# Patient Record
Sex: FEMALE | Race: BLACK OR AFRICAN AMERICAN | NOT HISPANIC OR LATINO | URBAN - METROPOLITAN AREA
[De-identification: names, ages, dates, MRNs, and addresses within clinical notes are randomized per-mention and may not be internally consistent; named-entity substitution may affect disease eponyms.]

---

## 2024-12-31 ENCOUNTER — APPOINTMENT (OUTPATIENT)
Dept: RADIOLOGY | Facility: HOSPITAL | Age: 34
End: 2024-12-31
Payer: COMMERCIAL

## 2024-12-31 ENCOUNTER — HOSPITAL ENCOUNTER (EMERGENCY)
Facility: HOSPITAL | Age: 34
Discharge: HOME | End: 2025-01-01
Attending: EMERGENCY MEDICINE
Payer: COMMERCIAL

## 2024-12-31 VITALS
SYSTOLIC BLOOD PRESSURE: 121 MMHG | TEMPERATURE: 98.8 F | BODY MASS INDEX: 25.77 KG/M2 | RESPIRATION RATE: 18 BRPM | HEIGHT: 70 IN | HEART RATE: 81 BPM | WEIGHT: 180 LBS | OXYGEN SATURATION: 98 % | DIASTOLIC BLOOD PRESSURE: 76 MMHG

## 2024-12-31 DIAGNOSIS — R11.2 NAUSEA AND VOMITING, UNSPECIFIED VOMITING TYPE: Primary | ICD-10-CM

## 2024-12-31 LAB
ALBUMIN SERPL BCP-MCNC: 5 G/DL (ref 3.4–5)
ALP SERPL-CCNC: 36 U/L (ref 33–110)
ALT SERPL W P-5'-P-CCNC: 21 U/L (ref 7–45)
ANION GAP SERPL CALC-SCNC: 13 MMOL/L (ref 10–20)
AST SERPL W P-5'-P-CCNC: 20 U/L (ref 9–39)
B-HCG SERPL-ACNC: <2 MIU/ML
BASOPHILS # BLD AUTO: 0.03 X10*3/UL (ref 0–0.1)
BASOPHILS NFR BLD AUTO: 0.3 %
BILIRUB SERPL-MCNC: 0.8 MG/DL (ref 0–1.2)
BUN SERPL-MCNC: 16 MG/DL (ref 6–23)
CALCIUM SERPL-MCNC: 9.9 MG/DL (ref 8.6–10.3)
CHLORIDE SERPL-SCNC: 101 MMOL/L (ref 98–107)
CO2 SERPL-SCNC: 27 MMOL/L (ref 21–32)
CREAT SERPL-MCNC: 0.7 MG/DL (ref 0.5–1.05)
EGFRCR SERPLBLD CKD-EPI 2021: >90 ML/MIN/1.73M*2
EOSINOPHIL # BLD AUTO: 0 X10*3/UL (ref 0–0.7)
EOSINOPHIL NFR BLD AUTO: 0 %
ERYTHROCYTE [DISTWIDTH] IN BLOOD BY AUTOMATED COUNT: 13.1 % (ref 11.5–14.5)
FLUAV RNA RESP QL NAA+PROBE: NOT DETECTED
FLUBV RNA RESP QL NAA+PROBE: NOT DETECTED
GLUCOSE SERPL-MCNC: 109 MG/DL (ref 74–99)
HCT VFR BLD AUTO: 38.7 % (ref 36–46)
HGB BLD-MCNC: 12.7 G/DL (ref 12–16)
IMM GRANULOCYTES # BLD AUTO: 0.05 X10*3/UL (ref 0–0.7)
IMM GRANULOCYTES NFR BLD AUTO: 0.6 % (ref 0–0.9)
LIPASE SERPL-CCNC: 6 U/L (ref 9–82)
LYMPHOCYTES # BLD AUTO: 1.81 X10*3/UL (ref 1.2–4.8)
LYMPHOCYTES NFR BLD AUTO: 21.1 %
MAGNESIUM SERPL-MCNC: 2.11 MG/DL (ref 1.6–2.4)
MCH RBC QN AUTO: 28.3 PG (ref 26–34)
MCHC RBC AUTO-ENTMCNC: 32.8 G/DL (ref 32–36)
MCV RBC AUTO: 86 FL (ref 80–100)
MONOCYTES # BLD AUTO: 0.45 X10*3/UL (ref 0.1–1)
MONOCYTES NFR BLD AUTO: 5.2 %
NEUTROPHILS # BLD AUTO: 6.24 X10*3/UL (ref 1.2–7.7)
NEUTROPHILS NFR BLD AUTO: 72.8 %
NRBC BLD-RTO: 0 /100 WBCS (ref 0–0)
PLATELET # BLD AUTO: 304 X10*3/UL (ref 150–450)
POTASSIUM SERPL-SCNC: 3.7 MMOL/L (ref 3.5–5.3)
PROT SERPL-MCNC: 8.4 G/DL (ref 6.4–8.2)
RBC # BLD AUTO: 4.48 X10*6/UL (ref 4–5.2)
SARS-COV-2 RNA RESP QL NAA+PROBE: NOT DETECTED
SODIUM SERPL-SCNC: 137 MMOL/L (ref 136–145)
WBC # BLD AUTO: 8.6 X10*3/UL (ref 4.4–11.3)

## 2024-12-31 PROCEDURE — 99285 EMERGENCY DEPT VISIT HI MDM: CPT | Mod: 25 | Performed by: EMERGENCY MEDICINE

## 2024-12-31 PROCEDURE — 74177 CT ABD & PELVIS W/CONTRAST: CPT | Mod: FOREIGN READ | Performed by: RADIOLOGY

## 2024-12-31 PROCEDURE — 2500000004 HC RX 250 GENERAL PHARMACY W/ HCPCS (ALT 636 FOR OP/ED): Performed by: EMERGENCY MEDICINE

## 2024-12-31 PROCEDURE — 36415 COLL VENOUS BLD VENIPUNCTURE: CPT | Performed by: NURSE PRACTITIONER

## 2024-12-31 PROCEDURE — 83735 ASSAY OF MAGNESIUM: CPT | Performed by: NURSE PRACTITIONER

## 2024-12-31 PROCEDURE — 96361 HYDRATE IV INFUSION ADD-ON: CPT

## 2024-12-31 PROCEDURE — 84702 CHORIONIC GONADOTROPIN TEST: CPT | Performed by: EMERGENCY MEDICINE

## 2024-12-31 PROCEDURE — 85025 COMPLETE CBC W/AUTO DIFF WBC: CPT | Performed by: NURSE PRACTITIONER

## 2024-12-31 PROCEDURE — 74177 CT ABD & PELVIS W/CONTRAST: CPT

## 2024-12-31 PROCEDURE — 80053 COMPREHEN METABOLIC PANEL: CPT | Performed by: NURSE PRACTITIONER

## 2024-12-31 PROCEDURE — 87636 SARSCOV2 & INF A&B AMP PRB: CPT | Performed by: NURSE PRACTITIONER

## 2024-12-31 PROCEDURE — 96374 THER/PROPH/DIAG INJ IV PUSH: CPT | Mod: 59

## 2024-12-31 PROCEDURE — 83690 ASSAY OF LIPASE: CPT | Performed by: NURSE PRACTITIONER

## 2024-12-31 PROCEDURE — 96372 THER/PROPH/DIAG INJ SC/IM: CPT | Performed by: EMERGENCY MEDICINE

## 2024-12-31 PROCEDURE — 2550000001 HC RX 255 CONTRASTS: Performed by: NURSE PRACTITIONER

## 2024-12-31 RX ORDER — ONDANSETRON HYDROCHLORIDE 2 MG/ML
4 INJECTION, SOLUTION INTRAVENOUS ONCE
Status: COMPLETED | OUTPATIENT
Start: 2024-12-31 | End: 2024-12-31

## 2024-12-31 RX ORDER — DICYCLOMINE HYDROCHLORIDE 10 MG/ML
20 INJECTION INTRAMUSCULAR ONCE
Status: COMPLETED | OUTPATIENT
Start: 2024-12-31 | End: 2024-12-31

## 2024-12-31 RX ADMIN — DICYCLOMINE HYDROCHLORIDE 20 MG: 10 INJECTION, SOLUTION INTRAMUSCULAR at 22:12

## 2024-12-31 RX ADMIN — ONDANSETRON 4 MG: 2 INJECTION INTRAMUSCULAR; INTRAVENOUS at 22:11

## 2024-12-31 RX ADMIN — SODIUM CHLORIDE, POTASSIUM CHLORIDE, SODIUM LACTATE AND CALCIUM CHLORIDE 1000 ML: 600; 310; 30; 20 INJECTION, SOLUTION INTRAVENOUS at 22:10

## 2024-12-31 RX ADMIN — IOHEXOL 75 ML: 350 INJECTION, SOLUTION INTRAVENOUS at 19:16

## 2024-12-31 ASSESSMENT — COLUMBIA-SUICIDE SEVERITY RATING SCALE - C-SSRS
6. HAVE YOU EVER DONE ANYTHING, STARTED TO DO ANYTHING, OR PREPARED TO DO ANYTHING TO END YOUR LIFE?: NO
2. HAVE YOU ACTUALLY HAD ANY THOUGHTS OF KILLING YOURSELF?: NO
1. IN THE PAST MONTH, HAVE YOU WISHED YOU WERE DEAD OR WISHED YOU COULD GO TO SLEEP AND NOT WAKE UP?: NO

## 2024-12-31 NOTE — ED TRIAGE NOTES
Patient state n/v/d x24 hours, unable to keep anything down. Patient endorses stomach cramping. Patient endorses dizziness.

## 2024-12-31 NOTE — ED TRIAGE NOTES
TRIAGE NOTE   I saw the patient as the Clinician in Triage and performed a brief history and physical exam, established acuity, and ordered appropriate tests to develop basic plan of care. Patient will be seen by an [facility], resident and/or physician who will independently evaluate the patient. Please see subsequent provider notes for further details and disposition.     Brief HPI: In brief, Belia Mueller is a 34 y.o. female no significant past medical  history of previous abdominal surgeries presenting to ED today from home with mom for evaluation of nausea/vomiting/diarrhea.  For the last 24 hours the patient has had nausea and multiple episodes of vomiting and nonbloody diarrhea.  Endorses persistent abdominal cramping.  No sick contacts, tainted food or recent travel.  Unable to keep anything down.  No medication taken prior to arrival for the symptoms.  Denies fever/chills, cough/cold symptoms, chest pain, shortness of breath, dysuria/hematuria, bloody/black bowel movements or any other complaints.  No smoking, EtOH or IV drugs.  PCP is Dr. Torres.    Focused Physical exam:   General: 34-year-old female sitting in wheelchair, appears extremely weak and fatigued but nontoxic.  Alert and oriented x 3.  Lips dry.  Well-nourished.  Skin: Pink, warm dry.  Cardiac: Regular rate and rhythm.  Pulmonary: Clear bilaterally.  Abdomen: Slightly rounded and soft with bowel sounds, generally tender.  No rebound or guarding.  No palpable organomegaly.    Plan/MDM:   Otherwise healthy 34-year-old female was evaluated at the bedside for the 24 hours of nausea/vomiting/nonbloody diarrhea and abdominal cramping.  Vital signs within normal limits.  Afebrile.  Abdomen is generally tender although it is soft with bowel sounds.  Denies being pregnant.  IV established, will check basic labs, UA/urine culture and CT abdomen/pelvis with contrast will be performed in preparation for further evaluation in the main ED.  Will need fluids  and antiemetics.  Patient is agreeable to this plan.    Please see subsequent provider note for further details and disposition

## 2025-01-01 PROCEDURE — 2500000004 HC RX 250 GENERAL PHARMACY W/ HCPCS (ALT 636 FOR OP/ED): Performed by: STUDENT IN AN ORGANIZED HEALTH CARE EDUCATION/TRAINING PROGRAM

## 2025-01-01 PROCEDURE — 96375 TX/PRO/DX INJ NEW DRUG ADDON: CPT

## 2025-01-01 PROCEDURE — 96361 HYDRATE IV INFUSION ADD-ON: CPT

## 2025-01-01 RX ORDER — ONDANSETRON 4 MG/1
4 TABLET, FILM COATED ORAL EVERY 6 HOURS
Qty: 6 TABLET | Refills: 0 | Status: SHIPPED | OUTPATIENT
Start: 2025-01-01 | End: 2025-01-04

## 2025-01-01 RX ORDER — DICYCLOMINE HYDROCHLORIDE 20 MG/1
20 TABLET ORAL 2 TIMES DAILY
Qty: 6 TABLET | Refills: 0 | Status: SHIPPED | OUTPATIENT
Start: 2025-01-01 | End: 2025-01-11

## 2025-01-01 RX ADMIN — SODIUM CHLORIDE, POTASSIUM CHLORIDE, SODIUM LACTATE AND CALCIUM CHLORIDE 1000 ML: 600; 310; 30; 20 INJECTION, SOLUTION INTRAVENOUS at 00:45

## 2025-01-01 RX ADMIN — PROMETHAZINE HYDROCHLORIDE 12.5 MG: 25 INJECTION INTRAMUSCULAR; INTRAVENOUS at 01:03

## 2025-01-01 NOTE — ED PROVIDER NOTES
HPI   Chief Complaint   Patient presents with    Vomiting       This is an otherwise healthy 34-year-old female who presents with nausea vomiting abdominal pain and diarrhea that started last night.  Patient reports profuse watery vomit inability to keep food or fluids down since last night.  She reports generalized abdominal pain, worse periumbilical.  Also complains of watery diarrhea.  No fever, no sick contacts.  No history of abdominal surgeries.  She does not think she is pregnant.      History provided by:  Patient   used: No            Patient History   No past medical history on file.  No past surgical history on file.  No family history on file.  Social History     Tobacco Use    Smoking status: Not on file    Smokeless tobacco: Not on file   Substance Use Topics    Alcohol use: Not on file    Drug use: Not on file       Physical Exam   ED Triage Vitals   Temperature Heart Rate Respirations BP   12/31/24 1748 12/31/24 1748 12/31/24 1748 12/31/24 1748   37.3 °C (99.1 °F) 79 14 123/79      Pulse Ox Temp Source Heart Rate Source Patient Position   12/31/24 1748 12/31/24 2136 12/31/24 2136 --   99 % Temporal Monitor       BP Location FiO2 (%)     12/31/24 2136 --     Right arm        Physical Exam  Vitals and nursing note reviewed.   Constitutional:       General: She is in acute distress.      Appearance: Normal appearance. She is not toxic-appearing or diaphoretic.   HENT:      Head: Normocephalic and atraumatic.      Nose: Nose normal. No congestion or rhinorrhea.      Mouth/Throat:      Mouth: Mucous membranes are moist.   Eyes:      General: No scleral icterus.        Right eye: No discharge.         Left eye: No discharge.      Extraocular Movements: Extraocular movements intact.      Conjunctiva/sclera: Conjunctivae normal.   Cardiovascular:      Rate and Rhythm: Normal rate and regular rhythm.      Heart sounds: No murmur heard.     No friction rub. No gallop.   Pulmonary:       Effort: Pulmonary effort is normal. No respiratory distress.      Breath sounds: Normal breath sounds. No stridor. No wheezing, rhonchi or rales.   Abdominal:      General: There is no distension.      Palpations: Abdomen is soft.      Tenderness: There is abdominal tenderness. There is no guarding or rebound.   Musculoskeletal:         General: No swelling, tenderness, deformity or signs of injury. Normal range of motion.      Cervical back: Normal range of motion and neck supple. No rigidity.   Skin:     General: Skin is warm and dry.      Coloration: Skin is not jaundiced or pale.      Findings: No bruising, erythema, lesion or rash.   Neurological:      General: No focal deficit present.      Mental Status: She is alert and oriented to person, place, and time.      Cranial Nerves: No cranial nerve deficit.      Sensory: No sensory deficit.      Motor: No weakness.   Psychiatric:         Mood and Affect: Mood normal.         Behavior: Behavior normal.           ED Course & MDM   Diagnoses as of 01/02/25 1525   Nausea and vomiting, unspecified vomiting type                 No data recorded     Vincent Coma Scale Score: 15 (12/31/24 2135 : Sha Sandoval RN)                           Medical Decision Making  34yF presents with n/v/d and abd crampy pain since yesterday.  Pt actively vomiting and unable to keep down even fluids in the ED.  Labs reassuring.  CT without acute pathology.  Pt treated with IV LR, zofran and bentyl and signed out to oncoming provider pending symptom improvement.    Amount and/or Complexity of Data Reviewed  Labs: ordered.  Radiology: ordered.    Risk  Prescription drug management.        Procedure  Procedures     Elyse H Klerman, MD  01/02/25 2088

## 2025-01-01 NOTE — DISCHARGE INSTRUCTIONS
Diagnosed with nausea and vomiting I do recommend utilizing the Zofran as needed for nausea and vomiting.  Bentyl as needed for abdominal spasms.  Should you been experiencing pain out of proportion fevers chills symptoms concerning to call 911 or return to the nearest emergency department immediately otherwise follow-up with your primary physician in the coming days to ensure improvement and resolution of symptoms.

## 2025-01-01 NOTE — PROGRESS NOTES
This is a 34-year-old female that was signed out to me with presented to the emergency department for nausea vomiting with associated diarrhea.  Patient was signed out to me by the previous physician to follow-up on p.o. challenge.  Plan at time of signout patient can likely be discharged home if tolerating p.o. and feels well enough.  I did review the patient's vitals she is hemodynamically stable viral swabs are negative no significant electrolyte derangements noted.  She has no urinary symptoms no indication for urinalysis.  CT imaging is benign.  Patient is reevaluated she is tolerating p.o.  Patient is requesting discharge home and feels that this is reasonable she is given Bentyl for abdominal spasms as well as Zofran for acute nausea.  She is appreciative of care agreeable with plan discharged in stable condition with strict return precautions.    Diagnoses as of 01/04/25 0142   Nausea and vomiting, unspecified vomiting type

## 2025-01-05 ENCOUNTER — HOSPITAL ENCOUNTER (EMERGENCY)
Facility: HOSPITAL | Age: 35
Discharge: HOME | End: 2025-01-05
Payer: COMMERCIAL

## 2025-01-05 VITALS
DIASTOLIC BLOOD PRESSURE: 88 MMHG | HEART RATE: 70 BPM | OXYGEN SATURATION: 98 % | RESPIRATION RATE: 16 BRPM | TEMPERATURE: 98.5 F | HEIGHT: 69 IN | WEIGHT: 180 LBS | SYSTOLIC BLOOD PRESSURE: 133 MMHG | BODY MASS INDEX: 26.66 KG/M2

## 2025-01-05 DIAGNOSIS — R11.2 NAUSEA AND VOMITING, UNSPECIFIED VOMITING TYPE: Primary | ICD-10-CM

## 2025-01-05 DIAGNOSIS — K29.70 GASTRITIS WITHOUT BLEEDING, UNSPECIFIED CHRONICITY, UNSPECIFIED GASTRITIS TYPE: ICD-10-CM

## 2025-01-05 LAB
ALBUMIN SERPL BCP-MCNC: 4.8 G/DL (ref 3.4–5)
ALP SERPL-CCNC: 36 U/L (ref 33–110)
ALT SERPL W P-5'-P-CCNC: 18 U/L (ref 7–45)
ANION GAP SERPL CALC-SCNC: 13 MMOL/L (ref 10–20)
APPEARANCE UR: CLEAR
AST SERPL W P-5'-P-CCNC: 12 U/L (ref 9–39)
BASOPHILS # BLD AUTO: 0.04 X10*3/UL (ref 0–0.1)
BASOPHILS NFR BLD AUTO: 0.5 %
BILIRUB SERPL-MCNC: 0.6 MG/DL (ref 0–1.2)
BILIRUB UR STRIP.AUTO-MCNC: NEGATIVE MG/DL
BUN SERPL-MCNC: 11 MG/DL (ref 6–23)
CALCIUM SERPL-MCNC: 9.7 MG/DL (ref 8.6–10.6)
CHLORIDE SERPL-SCNC: 102 MMOL/L (ref 98–107)
CO2 SERPL-SCNC: 24 MMOL/L (ref 21–32)
COLOR UR: ABNORMAL
CREAT SERPL-MCNC: 0.61 MG/DL (ref 0.5–1.05)
EGFRCR SERPLBLD CKD-EPI 2021: >90 ML/MIN/1.73M*2
EOSINOPHIL # BLD AUTO: 0.05 X10*3/UL (ref 0–0.7)
EOSINOPHIL NFR BLD AUTO: 0.7 %
ERYTHROCYTE [DISTWIDTH] IN BLOOD BY AUTOMATED COUNT: 12.6 % (ref 11.5–14.5)
FLUAV RNA RESP QL NAA+PROBE: NOT DETECTED
FLUBV RNA RESP QL NAA+PROBE: NOT DETECTED
GLUCOSE SERPL-MCNC: 101 MG/DL (ref 74–99)
GLUCOSE UR STRIP.AUTO-MCNC: NORMAL MG/DL
HCT VFR BLD AUTO: 36.5 % (ref 36–46)
HGB BLD-MCNC: 12.8 G/DL (ref 12–16)
HOLD SPECIMEN: NORMAL
IMM GRANULOCYTES # BLD AUTO: 0.02 X10*3/UL (ref 0–0.7)
IMM GRANULOCYTES NFR BLD AUTO: 0.3 % (ref 0–0.9)
KETONES UR STRIP.AUTO-MCNC: NEGATIVE MG/DL
LEUKOCYTE ESTERASE UR QL STRIP.AUTO: ABNORMAL
LIPASE SERPL-CCNC: 10 U/L (ref 9–82)
LYMPHOCYTES # BLD AUTO: 2.56 X10*3/UL (ref 1.2–4.8)
LYMPHOCYTES NFR BLD AUTO: 34.7 %
MCH RBC QN AUTO: 28.4 PG (ref 26–34)
MCHC RBC AUTO-ENTMCNC: 35.1 G/DL (ref 32–36)
MCV RBC AUTO: 81 FL (ref 80–100)
MONOCYTES # BLD AUTO: 0.45 X10*3/UL (ref 0.1–1)
MONOCYTES NFR BLD AUTO: 6.1 %
MUCOUS THREADS #/AREA URNS AUTO: ABNORMAL /LPF
NEUTROPHILS # BLD AUTO: 4.26 X10*3/UL (ref 1.2–7.7)
NEUTROPHILS NFR BLD AUTO: 57.7 %
NITRITE UR QL STRIP.AUTO: NEGATIVE
NRBC BLD-RTO: 0 /100 WBCS (ref 0–0)
PH UR STRIP.AUTO: 7 [PH]
PLATELET # BLD AUTO: 281 X10*3/UL (ref 150–450)
POTASSIUM SERPL-SCNC: 3.9 MMOL/L (ref 3.5–5.3)
PREGNANCY TEST URINE, POC: NEGATIVE
PROT SERPL-MCNC: 7.9 G/DL (ref 6.4–8.2)
PROT UR STRIP.AUTO-MCNC: NEGATIVE MG/DL
RBC # BLD AUTO: 4.51 X10*6/UL (ref 4–5.2)
RBC # UR STRIP.AUTO: ABNORMAL /UL
RBC #/AREA URNS AUTO: ABNORMAL /HPF
SARS-COV-2 RNA RESP QL NAA+PROBE: NOT DETECTED
SODIUM SERPL-SCNC: 135 MMOL/L (ref 136–145)
SP GR UR STRIP.AUTO: 1.02
SQUAMOUS #/AREA URNS AUTO: ABNORMAL /HPF
UROBILINOGEN UR STRIP.AUTO-MCNC: NORMAL MG/DL
WBC # BLD AUTO: 7.4 X10*3/UL (ref 4.4–11.3)
WBC #/AREA URNS AUTO: ABNORMAL /HPF

## 2025-01-05 PROCEDURE — 99284 EMERGENCY DEPT VISIT MOD MDM: CPT | Mod: 25

## 2025-01-05 PROCEDURE — 87636 SARSCOV2 & INF A&B AMP PRB: CPT | Performed by: PHYSICIAN ASSISTANT

## 2025-01-05 PROCEDURE — 96374 THER/PROPH/DIAG INJ IV PUSH: CPT

## 2025-01-05 PROCEDURE — 36415 COLL VENOUS BLD VENIPUNCTURE: CPT | Performed by: PHYSICIAN ASSISTANT

## 2025-01-05 PROCEDURE — 99284 EMERGENCY DEPT VISIT MOD MDM: CPT | Performed by: PHYSICIAN ASSISTANT

## 2025-01-05 PROCEDURE — 85025 COMPLETE CBC W/AUTO DIFF WBC: CPT | Performed by: PHYSICIAN ASSISTANT

## 2025-01-05 PROCEDURE — 80053 COMPREHEN METABOLIC PANEL: CPT | Performed by: PHYSICIAN ASSISTANT

## 2025-01-05 PROCEDURE — 81001 URINALYSIS AUTO W/SCOPE: CPT | Performed by: PHYSICIAN ASSISTANT

## 2025-01-05 PROCEDURE — 83690 ASSAY OF LIPASE: CPT | Performed by: PHYSICIAN ASSISTANT

## 2025-01-05 PROCEDURE — 2500000004 HC RX 250 GENERAL PHARMACY W/ HCPCS (ALT 636 FOR OP/ED): Mod: SE | Performed by: PHYSICIAN ASSISTANT

## 2025-01-05 PROCEDURE — 81025 URINE PREGNANCY TEST: CPT | Performed by: PHYSICIAN ASSISTANT

## 2025-01-05 PROCEDURE — 96375 TX/PRO/DX INJ NEW DRUG ADDON: CPT

## 2025-01-05 PROCEDURE — 96361 HYDRATE IV INFUSION ADD-ON: CPT

## 2025-01-05 PROCEDURE — 87086 URINE CULTURE/COLONY COUNT: CPT | Performed by: PHYSICIAN ASSISTANT

## 2025-01-05 RX ORDER — KETOROLAC TROMETHAMINE 30 MG/ML
30 INJECTION, SOLUTION INTRAMUSCULAR; INTRAVENOUS ONCE
Status: COMPLETED | OUTPATIENT
Start: 2025-01-05 | End: 2025-01-05

## 2025-01-05 RX ORDER — ONDANSETRON 4 MG/1
4-8 TABLET, ORALLY DISINTEGRATING ORAL EVERY 8 HOURS PRN
Qty: 30 TABLET | Refills: 0 | Status: SHIPPED | OUTPATIENT
Start: 2025-01-05

## 2025-01-05 RX ORDER — ONDANSETRON HYDROCHLORIDE 2 MG/ML
4 INJECTION, SOLUTION INTRAVENOUS ONCE
Status: COMPLETED | OUTPATIENT
Start: 2025-01-05 | End: 2025-01-05

## 2025-01-05 RX ADMIN — KETOROLAC TROMETHAMINE 30 MG: 30 INJECTION, SOLUTION INTRAMUSCULAR; INTRAVENOUS at 07:32

## 2025-01-05 RX ADMIN — ONDANSETRON 4 MG: 2 INJECTION INTRAMUSCULAR; INTRAVENOUS at 07:32

## 2025-01-05 RX ADMIN — SODIUM CHLORIDE, POTASSIUM CHLORIDE, SODIUM LACTATE AND CALCIUM CHLORIDE 1000 ML: 600; 310; 30; 20 INJECTION, SOLUTION INTRAVENOUS at 07:33

## 2025-01-05 ASSESSMENT — PAIN - FUNCTIONAL ASSESSMENT: PAIN_FUNCTIONAL_ASSESSMENT: 0-10

## 2025-01-05 ASSESSMENT — COLUMBIA-SUICIDE SEVERITY RATING SCALE - C-SSRS
2. HAVE YOU ACTUALLY HAD ANY THOUGHTS OF KILLING YOURSELF?: NO
6. HAVE YOU EVER DONE ANYTHING, STARTED TO DO ANYTHING, OR PREPARED TO DO ANYTHING TO END YOUR LIFE?: NO
1. IN THE PAST MONTH, HAVE YOU WISHED YOU WERE DEAD OR WISHED YOU COULD GO TO SLEEP AND NOT WAKE UP?: NO

## 2025-01-05 ASSESSMENT — PAIN SCALES - GENERAL: PAINLEVEL_OUTOF10: 0 - NO PAIN

## 2025-01-05 NOTE — Clinical Note
Belia Mueller was seen and treated in our emergency department on 1/5/2025.  She may return to work on 01/05/2025.       If you have any questions or concerns, please don't hesitate to call.      Stuart Ritter PA-C

## 2025-01-05 NOTE — ED TRIAGE NOTES
Patient presents with complaint of nausea, vomiting, since 12/31/24. Patient went to [facility] on day symptoms started and was discharged home by provider who stated she had a virus. Patient is back due to symptoms remaining the same.

## 2025-01-05 NOTE — DISCHARGE INSTRUCTIONS
Take the Zofran up to 2 tablets up to 3 times a day for nausea/vomiting.  For persistent symptoms follow-up with your PCP.  Additionally it might be beneficial to follow-up with GI specialist, call [phone] to schedule an appointment.  Try the brat diet: Bananas, rice, applesauce, toast, avoid foods high in fat and grease and try increasing her carbohydrates and fibers.

## 2025-01-05 NOTE — ED PROVIDER NOTES
Emergency Department Provider Note        History of Present Illness     34-year-old female with no medical history presents complaining of nausea and vomiting x 1 week.  Was seen at [facility] on 12/31 complaining of the same symptoms.  On review of EMR she had unremarkable evaluation including no leukocytosis or anemia, normal electrolytes, negative COVID and flu, CT A/P without acute findings.  She was discharged with Bentyl and Zofran.  States that despite these medications she still does not feel well.  States she has a few episodes of NBNB vomiting.  Denies any diarrhea.  Has generalized malaise.  Denies any chest pain cough or shortness of breath.  Denies any recent travel or sick contacts.  Denies any alcohol tobacco or drug use including marijuana use.  States she has not called her primary care doctor for follow-up.  States she feels dehydrated.      No past medical history on file.  No past surgical history on file.  Social History     Socioeconomic History    Marital status: Single     No Known Allergies      External Records Reviewed including ED notes, H&P, Discharge Summary, outpatient PCP/specialist notes.  Physical Exam       Triage Vitals: T 36.9 °C (98.5 °F)  HR 70  /88  RR 16  O2 98 % None (Room air)  GEN: NAD, well-appearing ambulating comfortably.  EYES:  EOMs grossly intact, anicteric sclera  NIDHI: Mucosa moist.  NECK: Supple.  CARD: RRR  PULMONARY: Moving air well. Clear all lung fields.  ABDOMEN: Soft, no guarding, no rigidity. Nontender. NABS  EXTREMITIES: Full ROM, no pitting edema,   SKIN: Intact, warm and dry  NEURO: Alert and oriented x 3, speech is clear, no obvious deficits noted.         Medical Decision Making & ED Course     34-year-old female presenting for abdominal pain with nausea and vomiting x 1 week.  On exam she is well-appearing ambulate comfortably.  VSS.  Lungs CTABL.  CV RRR.  ABD soft NTTP with NABS without rigidity or guarding.  Will check laboratory work and  provide symptomatic treatment.    ED Course as of 01/05/25 0922   Sun Jan 05, 2025   0837 CBC and Auto Differential  CBC reassuring with no leukocytosis or anemia [MANDI]   0919 Laboratory work reassuring with negative lipase, normal electrolytes, negative COVID and flu, UA equivocal however most likely contaminated with large squamous epithelial cells, reinforced by the fact that she has no lower urinary tract symptoms there is no indication for antibiotic therapy with low concern for UTI. [MANDI]   0921 She was reevaluated and states she felt significantly proved.  Tolerated p.o. well without difficulty.  I did discuss differential diagnosis including most likely viral gastritis, other possibilities discussed that would require follow-up with GI clinic.  As she is well-appearing tolerating p.o. well without difficulty with unremarkable CT within the past week and unremarkable laboratory work today she felt comfortable to be discharged home.  She is discharged with instructions to follow-up with PCP, GI clinic as needed.  Refill of the Zofran provided.  Return precautions reviewed.  All questions were answered. [MANDI]      ED Course User Index  [MANDI] Stuart Ritter PA-C         Diagnoses as of 01/05/25 0922   Nausea and vomiting, unspecified vomiting type   Gastritis without bleeding, unspecified chronicity, unspecified gastritis type     No orders to display     Labs Reviewed   COMPREHENSIVE METABOLIC PANEL - Abnormal       Result Value    Glucose 101 (*)     Sodium 135 (*)     Potassium 3.9      Chloride 102      Bicarbonate 24      Anion Gap 13      Urea Nitrogen 11      Creatinine 0.61      eGFR >90      Calcium 9.7      Albumin 4.8      Alkaline Phosphatase 36      Total Protein 7.9      AST 12      Bilirubin, Total 0.6      ALT 18     URINALYSIS WITH REFLEX CULTURE AND MICROSCOPIC - Abnormal    Color, Urine Light-Yellow      Appearance, Urine Clear      Specific Gravity, Urine 1.024      pH, Urine 7.0      Protein,  Urine NEGATIVE      Glucose, Urine Normal      Blood, Urine 0.03 (TRACE) (*)     Ketones, Urine NEGATIVE      Bilirubin, Urine NEGATIVE      Urobilinogen, Urine Normal      Nitrite, Urine NEGATIVE      Leukocyte Esterase, Urine 250 Mini/µL (*)    MICROSCOPIC ONLY, URINE - Abnormal    WBC, Urine 1-5      RBC, Urine 11-20 (*)     Squamous Epithelial Cells, Urine 10-25 (FEW)      Mucus, Urine 4+     LIPASE - Normal    Lipase 10      Narrative:     Venipuncture immediately after or during the administration of Metamizole may lead to falsely low results. Testing should be performed immediately prior to Metamizole dosing.   SARS-COV-2 AND INFLUENZA A/B PCR - Normal    Flu A Result Not Detected      Flu B Result Not Detected      Coronavirus 2019, PCR Not Detected      Narrative:     This assay has received FDA Emergency Use Authorization (EUA) and  is only authorized for the duration of time that circumstances exist to justify the authorization of the emergency use of in vitro diagnostic tests for the detection of SARS-CoV-2 virus and/or diagnosis of COVID-19 infection under section 564(b)(1) of the Act, 21 U.S.C. 360bbb-3(b)(1). Testing for SARS-CoV-2 is only recommended for patients who meet current clinical and/or epidemiological criteria as defined by federal, state, or local public health directives. This assay is an in vitro diagnostic nucleic acid amplification test for the qualitative detection of SARS-CoV-2, Influenza A, and Influenza B from nasopharyngeal specimens and has been validated for use at [facility]. Negative results do not preclude COVID-19 infections or Influenza A/B infections, and should not be used as the sole basis for diagnosis, treatment, or other management decisions. If Influenza A/B and RSV PCR results are negative, testing for Parainfluenza virus, Adenovirus and Metapneumovirus is routinely performed for [facility] pediatric oncology and intensive care inpatients, and is  available on other patients by placing an add-on request.    POCT PREGNANCY, URINE - Normal    Preg Test, Ur Negative     URINE CULTURE   CBC WITH AUTO DIFFERENTIAL    WBC 7.4      nRBC 0.0      RBC 4.51      Hemoglobin 12.8      Hematocrit 36.5      MCV 81      MCH 28.4      MCHC 35.1      RDW 12.6      Platelets 281      Neutrophils % 57.7      Immature Granulocytes %, Automated 0.3      Lymphocytes % 34.7      Monocytes % 6.1      Eosinophils % 0.7      Basophils % 0.5      Neutrophils Absolute 4.26      Immature Granulocytes Absolute, Automated 0.02      Lymphocytes Absolute 2.56      Monocytes Absolute 0.45      Eosinophils Absolute 0.05      Basophils Absolute 0.04     URINALYSIS WITH REFLEX CULTURE AND MICROSCOPIC    Narrative:     The following orders were created for panel order Urinalysis with Reflex Culture and Microscopic.  Procedure                               Abnormality         Status                     ---------                               -----------         ------                     Urinalysis with Reflex C...[[id number]]  Abnormal            Final result               Extra Urine Gray Tube[[id number]]                            In process                   Please view results for these tests on the individual orders.   EXTRA URINE GRAY TUBE       ----------------------------------------------------------------------------------------------------------------------------    This note was dictated using a speech recognition program.  While an attempt was made at proof reading to minimize errors, minor errors in transcription may be present call for questions.     Stuart Ritter PA-C  01/05/25 0548

## 2025-01-06 LAB — BACTERIA UR CULT: NORMAL

## 2025-01-23 ENCOUNTER — OFFICE VISIT (OUTPATIENT)
Dept: PRIMARY CARE | Facility: HOSPITAL | Age: 35
End: 2025-01-23
Payer: COMMERCIAL

## 2025-01-23 VITALS
DIASTOLIC BLOOD PRESSURE: 85 MMHG | SYSTOLIC BLOOD PRESSURE: 123 MMHG | BODY MASS INDEX: 27.85 KG/M2 | HEART RATE: 83 BPM | OXYGEN SATURATION: 98 % | WEIGHT: 188 LBS | HEIGHT: 69 IN | TEMPERATURE: 98 F

## 2025-01-23 DIAGNOSIS — Z00.00 ROUTINE GENERAL MEDICAL EXAMINATION AT HEALTH CARE FACILITY: Primary | ICD-10-CM

## 2025-01-23 DIAGNOSIS — Z00.00 MEDICARE ANNUAL WELLNESS VISIT, INITIAL: ICD-10-CM

## 2025-01-23 LAB
APPEARANCE UR: CLEAR
BILIRUB UR STRIP.AUTO-MCNC: NEGATIVE MG/DL
CAOX CRY #/AREA UR COMP ASSIST: ABNORMAL /HPF
CHOLEST SERPL-MCNC: 177 MG/DL (ref 0–199)
CHOLESTEROL/HDL RATIO: 4.5
COLOR UR: ABNORMAL
EST. AVERAGE GLUCOSE BLD GHB EST-MCNC: 128 MG/DL
GLUCOSE UR STRIP.AUTO-MCNC: NORMAL MG/DL
HBA1C MFR BLD: 6.1 %
HDLC SERPL-MCNC: 39.6 MG/DL
KETONES UR STRIP.AUTO-MCNC: NEGATIVE MG/DL
LDLC SERPL CALC-MCNC: 116 MG/DL
LEUKOCYTE ESTERASE UR QL STRIP.AUTO: NEGATIVE
MUCOUS THREADS #/AREA URNS AUTO: ABNORMAL /LPF
NITRITE UR QL STRIP.AUTO: NEGATIVE
NON HDL CHOLESTEROL: 137 MG/DL (ref 0–149)
PH UR STRIP.AUTO: 6 [PH]
PROT UR STRIP.AUTO-MCNC: NEGATIVE MG/DL
RBC # UR STRIP.AUTO: ABNORMAL /UL
RBC #/AREA URNS AUTO: ABNORMAL /HPF
SP GR UR STRIP.AUTO: 1.02
SQUAMOUS #/AREA URNS AUTO: ABNORMAL /HPF
TRIGL SERPL-MCNC: 108 MG/DL (ref 0–149)
UROBILINOGEN UR STRIP.AUTO-MCNC: NORMAL MG/DL
VLDL: 22 MG/DL (ref 0–40)
WBC #/AREA URNS AUTO: ABNORMAL /HPF

## 2025-01-23 PROCEDURE — 80061 LIPID PANEL: CPT

## 2025-01-23 PROCEDURE — 36415 COLL VENOUS BLD VENIPUNCTURE: CPT

## 2025-01-23 PROCEDURE — 83036 HEMOGLOBIN GLYCOSYLATED A1C: CPT

## 2025-01-23 PROCEDURE — 87389 HIV-1 AG W/HIV-1&-2 AB AG IA: CPT

## 2025-01-23 PROCEDURE — 99213 OFFICE O/P EST LOW 20 MIN: CPT | Mod: 25,GC

## 2025-01-23 PROCEDURE — 86803 HEPATITIS C AB TEST: CPT

## 2025-01-23 PROCEDURE — 81001 URINALYSIS AUTO W/SCOPE: CPT

## 2025-01-23 ASSESSMENT — ENCOUNTER SYMPTOMS
LOSS OF SENSATION IN FEET: 0
DEPRESSION: 0
OCCASIONAL FEELINGS OF UNSTEADINESS: 0

## 2025-01-23 ASSESSMENT — PATIENT HEALTH QUESTIONNAIRE - PHQ9
2. FEELING DOWN, DEPRESSED OR HOPELESS: NOT AT ALL
1. LITTLE INTEREST OR PLEASURE IN DOING THINGS: NOT AT ALL
SUM OF ALL RESPONSES TO PHQ9 QUESTIONS 1 AND 2: 0

## 2025-01-23 ASSESSMENT — PAIN SCALES - GENERAL: PAINLEVEL_OUTOF10: 2

## 2025-01-23 NOTE — PROGRESS NOTES
Chief complaint: Establish care      HPI:  Belia Mueller is a 34 y.o. female with PMHx significant for CIN3 (s/p LEEP 2021) presenting to [facility] today to establish care.    She feels fine, no acute medical concerns. Of note, she presented twice to the ER in the last 1 month for acute gastroenteritis with nausea&vomiting, diarrhea and abdominal pain. All clinical, laboratory and radiological evaluations were unremarkable. She was discharged on Zofran. She reports that symptoms have since resolved, although with residual mild nausea and lightheadedness. Pt denies any complaints of fever, chills, headache, chest pain, shortness of breath, or dysuria.      She follows with the O&G practice at [facility].    Review of systems:  As stated in HPI    Medications:  Current Outpatient Medications   Medication Instructions    ondansetron ODT (ZOFRAN-ODT) 4-8 mg, oral, Every 8 hours PRN       Allergies:  Not on File    Past medical history:  History reviewed. No pertinent past medical history.    Surgical history:  History reviewed. No pertinent surgical history.    Family history:  Family History   Problem Relation Name Age of Onset    Diabetes Mother      Hypertension Father       She is a single mother with 3 kids (yrs-S; 14-B, 6-G and 4-G).    Social history:   reports that she has never smoked. She has never used smokeless tobacco. She reports that she does not drink alcohol and does not use drugs.    Health maintenance:  Health Maintenance   Topic Date Due    Yearly Adult Physical  Never done    Lipid Panel  Never done    Hepatitis C Screening  Never done    Diabetes Screening  Never done    HPV Vaccines (2 - 3-dose series) 06/17/2009    Hepatitis B Vaccines (2 of 3 - 19+ 3-dose series) 06/10/2014    Varicella Vaccines (1 of 2 - 13+ 2-dose series) Never done    Cervical Cancer Screening  04/13/2024    Influenza Vaccine (1) Never done    COVID-19 Vaccine (1 - 2024-25 season) Never done     DTaP/Tdap/Td Vaccines (4 - Td or Tdap) 01/09/2028    Zoster Vaccines (1 of 2) 08/03/2040    MMR Vaccines  Completed    HIV Screening  Completed    HIB Vaccines  Aged Out    IPV Vaccines  Aged Out    Hepatitis A Vaccines  Aged Out    Meningococcal Vaccine  Aged Out    Rotavirus Vaccines  Aged Out    Pneumococcal Vaccine: Pediatrics and At-Risk Adult Patients  Aged Out    Chlamydia Screening  Discontinued       Sexual History:  Currently Sexually Active: Yes  Partners: Male  LMP: Date 6/7 yrs ago  Contraception: IUD, since 5years ago  STI Concern/Hx: Concerned at this time- Testing ordered      Vitals:  Vitals:    01/23/25 1306   BP: 123/85   Pulse: 83   Temp: 36.7 °C (98 °F)   SpO2: 98%       Physical Exam:  Physical Exam  Constitutional:       Appearance: Normal appearance.      Comments: Looks athletic and fit   HENT:      Mouth/Throat:      Mouth: Mucous membranes are moist.   Eyes:      Conjunctiva/sclera: Conjunctivae normal.   Cardiovascular:      Rate and Rhythm: Normal rate and regular rhythm.      Pulses: Normal pulses.      Heart sounds: Normal heart sounds.   Pulmonary:      Effort: Pulmonary effort is normal.      Breath sounds: Normal breath sounds.   Abdominal:      General: There is no distension.      Palpations: Abdomen is soft. There is no mass.      Tenderness: There is no abdominal tenderness.   Musculoskeletal:         General: Normal range of motion.      Cervical back: Normal range of motion.   Neurological:      General: No focal deficit present.      Mental Status: She is alert and oriented to person, place, and time.   Psychiatric:         Mood and Affect: Mood normal.         Behavior: Behavior normal.          Labs:  Lab Results   Component Value Date    WBC 7.4 01/05/2025    HGB 12.8 01/05/2025    HCT 36.5 01/05/2025    MCV 81 01/05/2025     01/05/2025       Lab Results   Component Value Date    GLUCOSE 101 (H) 01/05/2025    CALCIUM 9.7 01/05/2025     (L) 01/05/2025    K 3.9  "01/05/2025    CO2 24 01/05/2025     01/05/2025    BUN 11 01/05/2025    CREATININE 0.61 01/05/2025       No results found for: \"HGBA1C\"     No results found for: \"CHOL\"  No results found for: \"HDL\"  No results found for: \"LDLCALC\"  No results found for: \"TRIG\"  No components found for: \"CHOLHDL\"    Imaging:  CT abdomen pelvis w IV contrast  Result Date: 12/31/2024    No acute abnormalities are identified in the abdomen or pelvis.  There is no evidence of bowel inflammation or obstruction.. Signed by Ashutosh Serna MD      Assessment and plan:  Belia Mueller is a 34 y.o. female with PMHx significant for CIN3 (s/p LEEP 2021) presenting to [facility] today to establish care.      Updates 01/23/25  - Ordered UA, lipids, HbA1C, Hep C, HIV.  - Encouraged to continue healthy dietary habits and regular exercise.  - Patient not open to receiving any vaccines.      #CIN3  :: Follows with [facility] (last visit last month, per patient).  :: Had LEEP 2021  :: Per patient, follow-up post-LEEP has been unremarkable.      HEALTH MAINTENANCE   Antibody Testing   HIV: negative 02/2018, ordered today  Hepatitis C: ordered today   Vaccines  Influenza: Declined  Tdap: Declined  COVID: Declined  Cancer screening   Colonoscopy: Not due, age 45-74yo   Pap Smear: Up to date (21-29 q3 yrs; 30-65 w/HPV q5 yrs if no HPV q3 yrs)   Mammogram: Not due, (ACOG rec age 40, USPSTF age 50: shared decision making 40-50 then q1-2 yrs until age 74 and then shared decision making)   Low dose Chest CT: Not applicable, age 50-80 20yr smoking hx current OR quit w/in last 15yrs yearly   DEXA scan: Not applicable, females >65 or <65 hx of hip fracture       Follow-up in 1 year.    Patient and plan discussed with attending physician Dr. Sheffield.    Juan Ramon Alexis MD  PGY-1 Internal Medicine Resident     "

## 2025-01-23 NOTE — PATIENT INSTRUCTIONS
Dear Ms Ami    It was nice meeting you in clinic today. As discussed today, our plan is:     Labs - we collected labs today (Hep. C, lipids and HbA1C) and will call you with any abnormal results. If you have any questions or concerns prior to us calling feel free to call our office to have your questions addressed.     Like we discussed, please continue to follow healthy dietary habits and exercise regularly.    Please come back to see us in: 1 year   ------  If you have any problems or questions, please contact the clinic at [phone] to leave a message. Our fax number is [phone]. If your issue cannot wait until the next business day, please go to urgent care or the emergency department.     I also strongly urge all of my patients to register for [email or web address] by going to: [email or web address] (The  staff can also send you a text/email link to register when you check out).    No shows: It is understandable if you are unable to make it to a visit, but please cancel your appointment instead of not showing up. This helps to give other patients access to primary care and keeps wait times low.      Juan Ramon Alexis  [facility] Internal Medicine

## 2025-01-24 DIAGNOSIS — Z00.00 MEDICARE ANNUAL WELLNESS VISIT, INITIAL: Primary | ICD-10-CM

## 2025-01-24 LAB
HCV AB SER QL: NONREACTIVE
HIV 1+2 AB+HIV1 P24 AG SERPL QL IA: NONREACTIVE
HOLD SPECIMEN: NORMAL

## 2025-01-24 NOTE — PROGRESS NOTES
I saw and evaluated the patient. I personally obtained the key and critical portions of the history and physical exam or was physically present for key and critical portions performed by the resident/fellow. I reviewed the resident/fellow's documentation and discussed the patient with the resident/fellow. I agree with the resident/fellow's medical decision making as documented in the note.    Sharita Sheffield MD MPH

## 2025-03-05 ENCOUNTER — APPOINTMENT (OUTPATIENT)
Dept: GASTROENTEROLOGY | Facility: HOSPITAL | Age: 35
End: 2025-03-05
Payer: COMMERCIAL

## 2025-03-05 NOTE — PROGRESS NOTES
History Of Present Illness  Belia Mueller is a 34 y.o. female with no significant PMH is here as follow up after ER visit back in Jan 2025 and Dec 2024.    Pt initially went to the ER on 12/31/24 c/o n/v. Pt underwent labs and CT abd/p. Labs including cbc,cmp (mild elev glucose of 109) and viral testing for flu and Covid were normal.  Lipase was normal and urine preg test was negative. CT abd/p did not show any abnormality.  Pt discharged with zofran and bentyl and did not feel well so she returned to the ER on 1/5/25.  Labs obtained again and all fairly normal, except for probable contaminated UA.  Pt was given IVFs and then discharged home.    No previous GI consultations or endoscopies seen in EMR.              Past Medical History  She has no past medical history on file.    Surgical History  She has no past surgical history on file.     Social History  She reports that she has never smoked. She has never used smokeless tobacco. She reports that she does not drink alcohol and does not use drugs.    Family History  Family History   Problem Relation Name Age of Onset    Diabetes Mother      Hypertension Father          Allergies  Patient has no allergy information on record.      Review of Systems       There were no vitals taken for this visit.  Physical Exam           Relevant Results      Assessment/Plan:  {Assess/PlanSmartLinks:94869}    Nelsy Perez PA-C

## 2025-07-16 NOTE — PROGRESS NOTES
"Chief complaint: \"tick bite exposure\"    History and Physical  Belia Mueller is a 34 y.o. female with a past medical history of Cervical Intraepithelial Neoplastia 3 (s/p LEEP 2021) presenting for concern for tick bite exposure. Of note, pt was last seen by the Primary Care team on 01/2025 for routine health maintenance.     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 On July 11th she was washing up when she noticed a little object on her back she was picking at it and eventually noticed that she pulled off a tick. Using [employer] reverse search she was able to identify it as the American dog tick. Since that time she has been mainly asymptomatic without any headaches, nausea, vomiting, rash, fevers or night sweats.  She reports that she has a household cat at home and no dog.     Pre-chart items:  Referrals: reviewed.     Labs: reviewed.     Imaging: reviewed.     Housekeeping Items:  Equipment/Medications refills - reviewed  Medlist review - reviewed  Verify Pharmacy - reviewed  Care Gaps review - reviewed  Labs today - reviewed  Changes in ADLs/iADLs - reviewed    Review of systems:  As stated in HPI    Medications:  Current Outpatient Medications   Medication Instructions    doxycycline (VIBRAMYCIN) 200 mg, oral, Once, Take with at least 8 ounces (large glass) of water, do not lie down for 30 minutes after       Allergies:  RX Allergies[1]    Past medical history:  Medical History[2]    Surgical history:  Surgical History[3]    Family history:  Family History[4]    Social history:   reports that she has never smoked. She has never used smokeless tobacco. She reports that she does not drink alcohol and does not use drugs.    Health maintenance:  Health Maintenance   Topic Date Due    HPV Vaccines (2 - 3-dose series) 06/17/2009    Hepatitis B Vaccines (2 of 3 - 19+ 3-dose series) 06/10/2014    Varicella Vaccines (1 " of 2 - 13+ 2-dose series) Never done    Yearly Adult Physical  04/24/2022    Cervical Cancer Screening  04/13/2024    COVID-19 Vaccine (1 - 2024-25 season) Never done    Influenza Vaccine (1) 09/01/2025    Diabetes: Hemoglobin A1C  01/23/2026    Diabetes Screening  01/23/2026    DTaP/Tdap/Td Vaccines (4 - Td or Tdap) 01/09/2028    Lipid Panel  01/23/2030    Zoster Vaccines (1 of 2) 08/03/2040    MMR Vaccines  Completed    HIV Screening  Completed    Hepatitis C Screening  Completed    HIB Vaccines  Aged Out    IPV Vaccines  Aged Out    Hepatitis A Vaccines  Aged Out    Meningococcal Vaccine  Aged Out    Rotavirus Vaccines  Aged Out    Pneumococcal Vaccine: Pediatrics and At-Risk Adult Patients  Aged Out    Chlamydia Screening  Discontinued       Sexual History:  Currently Sexually Active: deferred  Partners: deferred  LMP: deferred  Contraception: deferred  STI Concern/Hx: deferred     Vitals:  Vitals:    07/17/25 1243   BP: 126/75   Pulse: 75   Temp: 35.8 °C (96.4 °F)   SpO2: 100%       Physical Exam:  General: Awake, alert, conversant, appears stated age  HEENT: Pupils equal and round, no scleral icterus or conjunctivitis, no evidence of jaundiced eyes   Skin: No suspect lesions or rashes noted on visible skin, no macular papular rash, or mobiliform rash, examined feet and hands, no erythema migrans  Chest: Ctab, normal respiratory effort, not on supplemental oxygen  Cardiac: Regular rate and rhythm, normal s1, s2, no M/R/G, no JVD  Abdomen: Soft, ND, NT, no involuntary guarding, no appreciable splenomegaly  : No flank pain or indwelling urinary catheter  EXT: No peripheral edema, no asymmetry noted  MSK: No focal joint swelling noted  Neuro: AOx4, moving all limbs spontaneously, follows commands  Psych: Coherent thought process, appropriate mood and affect    Laboratory Studies:  Lab Results   Component Value Date    WBC 7.4 01/05/2025    HGB 12.8 01/05/2025    HCT 36.5 01/05/2025    MCV 81 01/05/2025    PLT  "281 01/05/2025       Lab Results   Component Value Date    GLUCOSE 101 (H) 01/05/2025    CALCIUM 9.7 01/05/2025     (L) 01/05/2025    K 3.9 01/05/2025    CO2 24 01/05/2025     01/05/2025    BUN 11 01/05/2025    CREATININE 0.61 01/05/2025       Lab Results   Component Value Date    HGBA1C 6.1 (H) 01/23/2025        Lab Results   Component Value Date    CHOL 177 01/23/2025     Lab Results   Component Value Date    HDL 39.6 01/23/2025     Lab Results   Component Value Date    LDLCALC 116 (H) 01/23/2025     Lab Results   Component Value Date    TRIG 108 01/23/2025     No components found for: \"CHOLHDL\"    Imaging:  Imaging  No results found.    Cardiology, Vascular, and Other Imaging  No other imaging results found for the past 7 days      Assessment and Plan:  Belia Mueller is a 34 y.o. female with a past medica history of cervical intraepithelial neoplasia 3 (s/p LEEP 2021) presenting with concern for tick bite exposure.     #Tick Bite Exposure, American Dog Tick  :: Patient recently exposed to tick bite, unknown amount of time on body. However overall she has been completely asymptomatic.  Given rise in Lyme disease and area will administer single dose of doxycycline plan to defer laboratory testing due to her asymptomatic status.  Patient advised on common bacterial infections transmitted through tick bites including Lyme disease, [place] spotted fever, and tularemia.  Advised to call back if having worsening the symptomatic status.  Reassurance given and advised to treat itchiness symptomatically.   - Start doxycycline 200 mg PO every day (once)  - Advised to call back for worsening symptoms; defer laboratory testing    HEALTH MAINTENANCE   Antibody Testing   HIV: negative (01/2025)   Syphilis: deferred   Hepatitis C: negative (01/2025)    Vaccines  Influenza: deferred  Tdap: deferred  COVID: deferred    Cancer screening   Pap Smear: schedule with another provider outside our system next year " [21-29 q3 yrs; 30-65 w/HPV q5 yrs if no HPV q3 yrs]     Follow-up PRN with me.     Patient and plan discussed with attending physician Dr. Jonathan Villarreal MD, PGY-2  Department of Internal Medicine  [facility]          [1] No Known Allergies  [2] No past medical history on file.  [3] No past surgical history on file.  [4]   Family History  Problem Relation Name Age of Onset    Diabetes Mother      Hypertension Father

## 2025-07-17 ENCOUNTER — OFFICE VISIT (OUTPATIENT)
Dept: PRIMARY CARE | Facility: HOSPITAL | Age: 35
End: 2025-07-17
Payer: COMMERCIAL

## 2025-07-17 VITALS
TEMPERATURE: 96.4 F | HEART RATE: 75 BPM | WEIGHT: 192 LBS | HEIGHT: 69 IN | OXYGEN SATURATION: 100 % | DIASTOLIC BLOOD PRESSURE: 75 MMHG | BODY MASS INDEX: 28.44 KG/M2 | SYSTOLIC BLOOD PRESSURE: 126 MMHG
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
## 2025-07-17 PROCEDURE — 99213 OFFICE O/P EST LOW 20 MIN: CPT | Mod: GC

## 2025-07-17 RX ORDER — DOXYCYCLINE 100 MG/1
200 CAPSULE ORAL ONCE
Qty: 2 CAPSULE | Refills: 0 | Status: SHIPPED | OUTPATIENT
Start: 2025-07-17 | End: 2025-07-17

## 2025-07-17 ASSESSMENT — PATIENT HEALTH QUESTIONNAIRE - PHQ9
1. LITTLE INTEREST OR PLEASURE IN DOING THINGS: NOT AT ALL
SUM OF ALL RESPONSES TO PHQ9 QUESTIONS 1 AND 2: 0
2. FEELING DOWN, DEPRESSED OR HOPELESS: NOT AT ALL

## 2025-07-17 ASSESSMENT — ENCOUNTER SYMPTOMS
OCCASIONAL FEELINGS OF UNSTEADINESS: 0
LOSS OF SENSATION IN FEET: 0
DEPRESSION: 0

## 2025-07-17 ASSESSMENT — PAIN SCALES - GENERAL: PAINLEVEL_OUTOF10: 0-NO PAIN

## 2025-07-17 NOTE — PATIENT INSTRUCTIONS
As discussed today, our plan is:   -Administer a SINGLE dose of doxycycline 200 mg ONCE. As discussed, it is unclear wether you have been exposed to Lyme Disease or other tick-borne bacterial pathogens. Due to the re-surgence of Lyme Disease in our area, the best course of action is to give you a single dose of doxycycline once. There is no need for any blood work.   -If you begin to experience worsening signs and symptoms as evidenced on the handouts, please call us or go to the emergency department.     Please note the following below as well:     Labs - we collected labs today and will call you with any abnormal results. If you have any questions or concerns prior to us calling feel free to call our office to have your questions addressed.   Medication changes: SINGLE DOSE OF DOXYCYCLINE   4.   If you smoke or use other tobacco products, take steps to quit. Call [phone] for more information or to set up an appointment with  [facility]. The [place] Tobacco Quit Line is a free resource for people who don’t have insurance, receive Medicaid, pregnant women, or members of the [place] [facility]. Call 7-276-QUIT-NOW or 1-902.192.4741.    Please come back to see us in:  FOR YOUR ANNUAL VISIT.   ------  If you have any problems or questions, please contact the clinic at [phone] to leave a message. Our fax number is [phone]. If your issue cannot wait until the next business day, please go to urgent care or the emergency department.     I also strongly urge all of my patients to register for [facility] by going to: [email or web address]  (The  staff can also send you a text/email link to register when you check out).    No shows: It is understandable if you are unable to make it to a visit, but please cancel your appointment instead of not showing up. This helps to give other patients access to primary care and keeps wait times low.        Alfredo Ballesteros  [facility]   [phone]

## 2025-07-18 NOTE — PROGRESS NOTES
I saw and evaluated the patient. I personally obtained the key and critical portions of the history and physical exam or was physically present for key and critical portions performed by the resident/fellow. I reviewed the resident/fellow's documentation and discussed the patient with the resident/fellow. I agree with the resident/fellow's medical decision making as documented in the note with the exception/addition of the following:    Patient currently is asymptomatic.  She denies any skin rash or systemic symptoms of fever, chills, arthralgias.  Given the rise in tickborne disease including Lyme's disease in [place] will preventively treat with 1 dose of doxycycline 200 mg.  No further labs necessary at this time.    Jonathan Lim MD